# Patient Record
Sex: MALE | Race: WHITE | Employment: OTHER | URBAN - METROPOLITAN AREA
[De-identification: names, ages, dates, MRNs, and addresses within clinical notes are randomized per-mention and may not be internally consistent; named-entity substitution may affect disease eponyms.]

---

## 2020-05-20 NOTE — H&P
[facility]    PATIENT'S NAME: Shantanu Muñoz   ATTENDING PHYSICIAN: Shaun Worthy.  Montserrat Lucas MD   PATIENT ACCOUNT#:   [id number]    LOCATION:    MEDICAL RECORD #:   [id number]       YOB: 1945  ADMISSION DATE:       06/02/2020    HISTORY ALLERGIES:  The patient has no known drug allergies. The patient is allergic to latex. SOCIAL HISTORY:  The patient does not smoke, drinks alcohol rarely. PHYSICAL EXAMINATION:    GENERAL:  A 6 feet 4 inch tall, 220-pound male with a BMI of 27. New or recurrent cancers were reviewed. Possibility that no residual cancer would be found in the excised specimen was discussed.   Possibility that frozen section control of margins could be read as negative, subsequent permanent margins being read as pos

## 2020-05-30 RX ORDER — ASPIRIN 81 MG/1
81 TABLET ORAL DAILY
COMMUNITY

## 2020-05-30 RX ORDER — LOSARTAN POTASSIUM 50 MG/1
50 TABLET ORAL DAILY
COMMUNITY

## 2020-05-30 RX ORDER — ATORVASTATIN CALCIUM 80 MG/1
80 TABLET, FILM COATED ORAL DAILY
COMMUNITY

## 2020-05-30 RX ORDER — PRASUGREL 10 MG/1
10 TABLET, FILM COATED ORAL DAILY
COMMUNITY

## 2020-05-30 RX ORDER — LEVOTHYROXINE SODIUM 0.07 MG/1
75 TABLET ORAL
COMMUNITY

## 2020-06-01 ENCOUNTER — LAB ENCOUNTER (OUTPATIENT)
Dept: LAB | Facility: HOSPITAL | Age: 75
End: 2020-06-01
Attending: PLASTIC SURGERY
Payer: MEDICARE

## 2020-06-01 DIAGNOSIS — Z01.818 PREOP TESTING: ICD-10-CM

## 2020-06-02 ENCOUNTER — ANESTHESIA (OUTPATIENT)
Dept: SURGERY | Facility: HOSPITAL | Age: 75
End: 2020-06-02
Payer: MEDICARE

## 2020-06-02 ENCOUNTER — ANESTHESIA EVENT (OUTPATIENT)
Dept: SURGERY | Facility: HOSPITAL | Age: 75
End: 2020-06-02
Payer: MEDICARE

## 2020-06-02 ENCOUNTER — HOSPITAL ENCOUNTER (OUTPATIENT)
Facility: HOSPITAL | Age: 75
Setting detail: HOSPITAL OUTPATIENT SURGERY
Discharge: HOME | End: 2020-06-02
Attending: PLASTIC SURGERY | Admitting: PLASTIC SURGERY
Payer: MEDICARE

## 2020-06-02 VITALS
HEIGHT: 76 IN | OXYGEN SATURATION: 96 % | RESPIRATION RATE: 14 BRPM | DIASTOLIC BLOOD PRESSURE: 68 MMHG | WEIGHT: 220 LBS | BODY MASS INDEX: 26.79 KG/M2 | TEMPERATURE: 97 F | HEART RATE: 60 BPM | SYSTOLIC BLOOD PRESSURE: 137 MMHG

## 2020-06-02 DIAGNOSIS — C44.329 SQUAMOUS CELL CARCINOMA OF SKIN OF RIGHT CHEEK: ICD-10-CM

## 2020-06-02 DIAGNOSIS — Z01.818 PREOP TESTING: Primary | ICD-10-CM

## 2020-06-02 PROCEDURE — 88331 PATH CONSLTJ SURG 1 BLK 1SPC: CPT | Performed by: PLASTIC SURGERY

## 2020-06-02 PROCEDURE — 0HX1XZZ TRANSFER FACE SKIN, EXTERNAL APPROACH: ICD-10-PCS | Performed by: PLASTIC SURGERY

## 2020-06-02 PROCEDURE — 88305 TISSUE EXAM BY PATHOLOGIST: CPT | Performed by: PLASTIC SURGERY

## 2020-06-02 PROCEDURE — 0HB1XZZ EXCISION OF FACE SKIN, EXTERNAL APPROACH: ICD-10-PCS | Performed by: PLASTIC SURGERY

## 2020-06-02 RX ORDER — NALOXONE HYDROCHLORIDE 0.4 MG/ML
80 INJECTION, SOLUTION INTRAMUSCULAR; INTRAVENOUS; SUBCUTANEOUS AS NEEDED
Status: DISCONTINUED | OUTPATIENT
Start: 2020-06-02 | End: 2020-06-02

## 2020-06-02 RX ORDER — MORPHINE SULFATE 4 MG/ML
4 INJECTION, SOLUTION INTRAMUSCULAR; INTRAVENOUS EVERY 10 MIN PRN
Status: DISCONTINUED | OUTPATIENT
Start: 2020-06-02 | End: 2020-06-02

## 2020-06-02 RX ORDER — HYDROMORPHONE HYDROCHLORIDE 1 MG/ML
0.2 INJECTION, SOLUTION INTRAMUSCULAR; INTRAVENOUS; SUBCUTANEOUS EVERY 5 MIN PRN
Status: DISCONTINUED | OUTPATIENT
Start: 2020-06-02 | End: 2020-06-02

## 2020-06-02 RX ORDER — HALOPERIDOL 5 MG/ML
0.25 INJECTION INTRAMUSCULAR ONCE AS NEEDED
Status: DISCONTINUED | OUTPATIENT
Start: 2020-06-02 | End: 2020-06-02

## 2020-06-02 RX ORDER — HYDROMORPHONE HYDROCHLORIDE 1 MG/ML
0.6 INJECTION, SOLUTION INTRAMUSCULAR; INTRAVENOUS; SUBCUTANEOUS EVERY 5 MIN PRN
Status: DISCONTINUED | OUTPATIENT
Start: 2020-06-02 | End: 2020-06-02

## 2020-06-02 RX ORDER — ONDANSETRON 2 MG/ML
4 INJECTION INTRAMUSCULAR; INTRAVENOUS ONCE AS NEEDED
Status: DISCONTINUED | OUTPATIENT
Start: 2020-06-02 | End: 2020-06-02

## 2020-06-02 RX ORDER — SODIUM CHLORIDE, SODIUM LACTATE, POTASSIUM CHLORIDE, CALCIUM CHLORIDE 600; 310; 30; 20 MG/100ML; MG/100ML; MG/100ML; MG/100ML
INJECTION, SOLUTION INTRAVENOUS CONTINUOUS
Status: DISCONTINUED | OUTPATIENT
Start: 2020-06-02 | End: 2020-06-02

## 2020-06-02 RX ORDER — FAMOTIDINE 20 MG/1
20 TABLET ORAL ONCE
Status: DISCONTINUED | OUTPATIENT
Start: 2020-06-02 | End: 2020-06-02 | Stop reason: HOSPADM

## 2020-06-02 RX ORDER — DIAPER,BRIEF,INFANT-TODD,DISP
EACH MISCELLANEOUS AS NEEDED
Status: DISCONTINUED | OUTPATIENT
Start: 2020-06-02 | End: 2020-06-02 | Stop reason: HOSPADM

## 2020-06-02 RX ORDER — MORPHINE SULFATE 10 MG/ML
6 INJECTION, SOLUTION INTRAMUSCULAR; INTRAVENOUS EVERY 10 MIN PRN
Status: DISCONTINUED | OUTPATIENT
Start: 2020-06-02 | End: 2020-06-02

## 2020-06-02 RX ORDER — PROCHLORPERAZINE EDISYLATE 5 MG/ML
5 INJECTION INTRAMUSCULAR; INTRAVENOUS ONCE AS NEEDED
Status: DISCONTINUED | OUTPATIENT
Start: 2020-06-02 | End: 2020-06-02

## 2020-06-02 RX ORDER — ACETAMINOPHEN 500 MG
1000 TABLET ORAL ONCE
Status: COMPLETED | OUTPATIENT
Start: 2020-06-02 | End: 2020-06-02

## 2020-06-02 RX ORDER — MIDAZOLAM HYDROCHLORIDE 1 MG/ML
INJECTION INTRAMUSCULAR; INTRAVENOUS AS NEEDED
Status: DISCONTINUED | OUTPATIENT
Start: 2020-06-02 | End: 2020-06-02 | Stop reason: SURG

## 2020-06-02 RX ORDER — METOCLOPRAMIDE 10 MG/1
10 TABLET ORAL ONCE
Status: DISCONTINUED | OUTPATIENT
Start: 2020-06-02 | End: 2020-06-02 | Stop reason: HOSPADM

## 2020-06-02 RX ORDER — ACETAMINOPHEN 325 MG/1
650 TABLET ORAL EVERY 6 HOURS PRN
Status: DISCONTINUED | OUTPATIENT
Start: 2020-06-02 | End: 2020-06-02

## 2020-06-02 RX ORDER — HYDROMORPHONE HYDROCHLORIDE 1 MG/ML
0.4 INJECTION, SOLUTION INTRAMUSCULAR; INTRAVENOUS; SUBCUTANEOUS EVERY 5 MIN PRN
Status: DISCONTINUED | OUTPATIENT
Start: 2020-06-02 | End: 2020-06-02

## 2020-06-02 RX ORDER — ACETAMINOPHEN AND CODEINE PHOSPHATE 300; 30 MG/1; MG/1
1 TABLET ORAL EVERY 6 HOURS PRN
Status: DISCONTINUED | OUTPATIENT
Start: 2020-06-02 | End: 2020-06-02

## 2020-06-02 RX ORDER — CEFAZOLIN SODIUM/WATER 2 G/20 ML
2 SYRINGE (ML) INTRAVENOUS ONCE
Status: COMPLETED | OUTPATIENT
Start: 2020-06-02 | End: 2020-06-02

## 2020-06-02 RX ORDER — MORPHINE SULFATE 4 MG/ML
2 INJECTION, SOLUTION INTRAMUSCULAR; INTRAVENOUS EVERY 10 MIN PRN
Status: DISCONTINUED | OUTPATIENT
Start: 2020-06-02 | End: 2020-06-02

## 2020-06-02 RX ORDER — LIDOCAINE HYDROCHLORIDE AND EPINEPHRINE 5; 5 MG/ML; UG/ML
INJECTION, SOLUTION INFILTRATION; PERINEURAL AS NEEDED
Status: DISCONTINUED | OUTPATIENT
Start: 2020-06-02 | End: 2020-06-02 | Stop reason: HOSPADM

## 2020-06-02 RX ORDER — HYDROCODONE BITARTRATE AND ACETAMINOPHEN 5; 325 MG/1; MG/1
1 TABLET ORAL AS NEEDED
Status: DISCONTINUED | OUTPATIENT
Start: 2020-06-02 | End: 2020-06-02

## 2020-06-02 RX ORDER — HYDROCODONE BITARTRATE AND ACETAMINOPHEN 5; 325 MG/1; MG/1
2 TABLET ORAL AS NEEDED
Status: DISCONTINUED | OUTPATIENT
Start: 2020-06-02 | End: 2020-06-02

## 2020-06-02 RX ORDER — ACETAMINOPHEN AND CODEINE PHOSPHATE 300; 30 MG/1; MG/1
2 TABLET ORAL EVERY 6 HOURS PRN
Status: DISCONTINUED | OUTPATIENT
Start: 2020-06-02 | End: 2020-06-02

## 2020-06-02 RX ADMIN — CEFAZOLIN SODIUM/WATER 2 G: 2 G/20 ML SYRINGE (ML) INTRAVENOUS at 07:25:00

## 2020-06-02 RX ADMIN — MIDAZOLAM HYDROCHLORIDE 1 MG: 1 INJECTION INTRAMUSCULAR; INTRAVENOUS at 07:19:00

## 2020-06-02 RX ADMIN — SODIUM CHLORIDE, SODIUM LACTATE, POTASSIUM CHLORIDE, CALCIUM CHLORIDE: 600; 310; 30; 20 INJECTION, SOLUTION INTRAVENOUS at 06:54:00

## 2020-06-02 RX ADMIN — MIDAZOLAM HYDROCHLORIDE 1 MG: 1 INJECTION INTRAMUSCULAR; INTRAVENOUS at 07:15:00

## 2020-06-02 NOTE — DISCHARGE SUMMARY
[facility] HOSP - [facility]    Discharge Summary    Charito Nelson Patient Status:  Hospital Outpatient Surgery    1945 MRN [id number]   Location [address] Attending [address] Villalta, Sam Dixon MD   Hosp Day # 0 PCP No carmela Your judgment and reflexes may be altered, even if you feel like your normal self.       We Recommend:   · Do not drive any motor vehicle or bicycle   · Avoid mowing the lawn, playing sports, or working with power tools/applicances (power saws, electric kni office. 2. Cleansing:  After dressing is changed at office, wound cleansing can be begin. You may shower, but do not let water run directly on the wound. You may gently wash around the wound with mild soap and water.   Dab wound dry, do not rub across

## 2020-06-02 NOTE — BRIEF OP NOTE
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

## 2020-06-02 NOTE — OPERATIVE REPORT
[facility]    PATIENT'S NAME: Kuldip Lozano   ATTENDING PHYSICIAN: Gatito Shah. Tisha Ingram MD   OPERATING PHYSICIAN: Gatito Shah.  Tisha Ingram MD   PATIENT ACCOUNT#:   [id number]    LOCATION:  [address]  MEDICAL RECORD #:   [id number] the 11 o'clock to 12 o'clock to 1 o'clock position, taking a dart along that direction which would roughly parallel the direction of the nasolabial fold. This would be extended toward the nasomaxillary area.   This was now excised, marked with suture at th from the lower eyelid area, avoiding any significant inferior traction on the lower eyelid. All skin was cleansed with saline. There was 2-second capillary refill in the central portion of flap at completion of procedure.   The wound was covered with baci

## 2020-06-02 NOTE — ANESTHESIA PREPROCEDURE EVALUATION
Anesthesia PreOp Note    HPI:     Meagan Sepulveda is a 69year old male who presents for preoperative consultation requested by: Marquita Caba., Wilfrid Fonseca MD    Date of Surgery: 6/2/2020    Procedure(s):  EXCISION LESION HEAD/NECK/FACE  Indication: squamous cell RASH    Family History   Problem Relation Age of Onset   • Cancer Father         Colon cancer   • No Known Problems Mother      Social History    Socioeconomic History      Marital status:       Spouse name: Not on file      Number of children: Not Temp:  97.8 °F (36.6 °C)   TempSrc:  Oral   SpO2:  96%   Weight: 99.8 kg (220 lb) 99.8 kg (220 lb)   Height: 1.93 m (6' 4\") 1.93 m (6' 4\")        Anesthesia Evaluation     Patient summary reviewed and Nursing notes reviewed    Airway   Mallampati: II

## 2020-06-02 NOTE — INTERVAL H&P NOTE
Pre-op Diagnosis: squamous cell cancer right mid cheek    The above referenced H&P was reviewed by Neal Aaron MD on 6/2/2020, the patient was examined and no significant changes have occurred in the patient's condition since the H&P was performed

## 2023-05-17 ENCOUNTER — OFFICE VISIT (OUTPATIENT)
Facility: CLINIC | Age: 78
End: 2023-05-17

## 2023-05-17 ENCOUNTER — TELEPHONE (OUTPATIENT)
Facility: CLINIC | Age: 78
End: 2023-05-17

## 2023-05-17 VITALS
BODY MASS INDEX: 27.05 KG/M2 | HEART RATE: 57 BPM | SYSTOLIC BLOOD PRESSURE: 109 MMHG | DIASTOLIC BLOOD PRESSURE: 57 MMHG | HEIGHT: 76 IN | WEIGHT: 222.13 LBS

## 2023-05-17 DIAGNOSIS — Z80.0 FAMILY HISTORY OF COLON CANCER: Primary | ICD-10-CM

## 2023-05-17 DIAGNOSIS — Z86.010 HISTORY OF COLON POLYPS: ICD-10-CM

## 2023-05-17 PROCEDURE — 3074F SYST BP LT 130 MM HG: CPT | Performed by: INTERNAL MEDICINE

## 2023-05-17 PROCEDURE — 1159F MED LIST DOCD IN RCRD: CPT | Performed by: INTERNAL MEDICINE

## 2023-05-17 PROCEDURE — 3078F DIAST BP <80 MM HG: CPT | Performed by: INTERNAL MEDICINE

## 2023-05-17 PROCEDURE — 99203 OFFICE O/P NEW LOW 30 MIN: CPT | Performed by: INTERNAL MEDICINE

## 2023-05-17 PROCEDURE — 3008F BODY MASS INDEX DOCD: CPT | Performed by: INTERNAL MEDICINE

## 2023-05-17 PROCEDURE — 1126F AMNT PAIN NOTED NONE PRSNT: CPT | Performed by: INTERNAL MEDICINE

## 2023-05-17 RX ORDER — TRIAMCINOLONE ACETONIDE 5 MG/G
1 CREAM TOPICAL 2 TIMES DAILY
COMMUNITY
Start: 2023-05-08

## 2023-05-17 RX ORDER — KETOCONAZOLE 20 MG/ML
1 SHAMPOO TOPICAL DAILY
COMMUNITY
Start: 2023-03-02

## 2023-05-17 RX ORDER — AMLODIPINE BESYLATE 5 MG/1
5 TABLET ORAL DAILY
COMMUNITY
Start: 2023-02-13

## 2023-05-17 RX ORDER — SODIUM, POTASSIUM,MAG SULFATES 17.5-3.13G
SOLUTION, RECONSTITUTED, ORAL ORAL
Qty: 1 EACH | Refills: 0 | Status: SHIPPED | OUTPATIENT
Start: 2023-05-17

## 2023-05-17 RX ORDER — METOPROLOL SUCCINATE 100 MG/1
100 TABLET, EXTENDED RELEASE ORAL DAILY
COMMUNITY
Start: 2022-06-03

## 2023-05-17 RX ORDER — CHLORTHALIDONE 25 MG/1
25 TABLET ORAL DAILY
COMMUNITY
Start: 2023-04-10

## 2023-05-17 NOTE — TELEPHONE ENCOUNTER
Scheduled for:  Colonoscopy [address] , [address] [facility]   Provider Name:    Date:  7/11/2023  Location:  [facility]   Sedation:  Mac   Time: 1:00 Pm (pt is aware to arrive at 12:00 Pm )   Prep: Split dose Suprep  Prep instructions were given to pt in the office, I discuss prep Instructions with patient at the time of the appointment which he verbally understood and given the prep instructions at the time of the appointment  Meds/Allergies Reconciled?:  Physician reviewed     Diagnosis with codes:  Family history of Colon cancer Z80.0 ,H/o Colon Polyps Z86.010  Was patient informed to call insurance with codes (Y/N):  Yes, I confirmed  [employer]  medicare advantage insurance with the patient. The patient also verbally understands to call his  insurance to check for pre-cert, codes were given on prep instructions. Referral sent?:  Referral was sent at the time of electronic surgical scheduling. [facility] or [facility] Th  notified?:  I sent an electronic request to Endo Scheduling and received a confirmation today. Medication Orders: This patient verbally confirmed that he is  taking:   Iron, blood thinners to hold his Prasugrel for 7 days prior to procedure and will notify Dr. Ryan Jerry's office at [address] [place] [address] - if its okay to hold. , BP meds, and is not diabetic   Has latex allergy, Not PCN allergy and does not have a pacemaker Pt is aware to NOT take iron pills, herbal meds and diet supplements for 7 days before exam. Also to NOT take any form of alcohol, recreational drugs and any forms of ED meds 24 hours before exam.    Misc Orders:  Patient verbally understood & will await a phone call from [facility] to schedule.       Further instructions given by staff:

## 2023-05-17 NOTE — PATIENT INSTRUCTIONS
1.  Schedule colonoscopy for a family history of colon cancer and a personal history of colon polyps following a split dose Suprep and either IV sedation or monitored anesthesia care per scheduling. 2.  Hold prasugrel for 7 days preceding your procedure (if okay with your cardiologist). Aspirin should be continued.

## 2023-05-17 NOTE — TELEPHONE ENCOUNTER
Fyi;Rn's  Patient will be having Procedure on 7/11/2023 at [facility] for Colonoscopy   Please Call PCP or Endocrine for the following recommendation for Medications adjustment of Diabetic meds or insulin   Or Anti-coagulants or anti-platelet ( Prasugrel )    Dr.Amir Jerry at [facility] (Cardiology) at [phone]  Prior to Procedure  Hold prasugrel for 7 days preceding your procedure (if okay with your cardiologist). Aspirin should be continued.

## 2023-07-11 ENCOUNTER — ANESTHESIA (OUTPATIENT)
Dept: ENDOSCOPY | Facility: HOSPITAL | Age: 78
End: 2023-07-11
Payer: MEDICARE

## 2023-07-11 ENCOUNTER — HOSPITAL ENCOUNTER (OUTPATIENT)
Facility: HOSPITAL | Age: 78
Setting detail: HOSPITAL OUTPATIENT SURGERY
Discharge: HOME | End: 2023-07-11
Attending: INTERNAL MEDICINE | Admitting: INTERNAL MEDICINE
Payer: MEDICARE

## 2023-07-11 ENCOUNTER — ANESTHESIA EVENT (OUTPATIENT)
Dept: ENDOSCOPY | Facility: HOSPITAL | Age: 78
End: 2023-07-11
Payer: MEDICARE

## 2023-07-11 VITALS
BODY MASS INDEX: 26.79 KG/M2 | OXYGEN SATURATION: 96 % | HEIGHT: 76 IN | HEART RATE: 56 BPM | SYSTOLIC BLOOD PRESSURE: 136 MMHG | DIASTOLIC BLOOD PRESSURE: 70 MMHG | WEIGHT: 220 LBS | RESPIRATION RATE: 15 BRPM

## 2023-07-11 DIAGNOSIS — Z86.010 HISTORY OF COLON POLYPS: ICD-10-CM

## 2023-07-11 DIAGNOSIS — Z80.0 FAMILY HISTORY OF COLON CANCER: ICD-10-CM

## 2023-07-11 PROCEDURE — 45385 COLONOSCOPY W/LESION REMOVAL: CPT | Performed by: INTERNAL MEDICINE

## 2023-07-11 PROCEDURE — 0DBH8ZX EXCISION OF CECUM, VIA NATURAL OR ARTIFICIAL OPENING ENDOSCOPIC, DIAGNOSTIC: ICD-10-PCS | Performed by: INTERNAL MEDICINE

## 2023-07-11 PROCEDURE — 0DBM8ZX EXCISION OF DESCENDING COLON, VIA NATURAL OR ARTIFICIAL OPENING ENDOSCOPIC, DIAGNOSTIC: ICD-10-PCS | Performed by: INTERNAL MEDICINE

## 2023-07-11 PROCEDURE — 0DBL8ZX EXCISION OF TRANSVERSE COLON, VIA NATURAL OR ARTIFICIAL OPENING ENDOSCOPIC, DIAGNOSTIC: ICD-10-PCS | Performed by: INTERNAL MEDICINE

## 2023-07-11 PROCEDURE — 0DBK8ZX EXCISION OF ASCENDING COLON, VIA NATURAL OR ARTIFICIAL OPENING ENDOSCOPIC, DIAGNOSTIC: ICD-10-PCS | Performed by: INTERNAL MEDICINE

## 2023-07-11 RX ORDER — EPHEDRINE SULFATE 50 MG/ML
INJECTION INTRAVENOUS AS NEEDED
Status: DISCONTINUED | OUTPATIENT
Start: 2023-07-11 | End: 2023-07-11 | Stop reason: SURG

## 2023-07-11 RX ORDER — LIDOCAINE HYDROCHLORIDE 10 MG/ML
INJECTION, SOLUTION EPIDURAL; INFILTRATION; INTRACAUDAL; PERINEURAL AS NEEDED
Status: DISCONTINUED | OUTPATIENT
Start: 2023-07-11 | End: 2023-07-11 | Stop reason: SURG

## 2023-07-11 RX ORDER — SODIUM CHLORIDE, SODIUM LACTATE, POTASSIUM CHLORIDE, CALCIUM CHLORIDE 600; 310; 30; 20 MG/100ML; MG/100ML; MG/100ML; MG/100ML
INJECTION, SOLUTION INTRAVENOUS CONTINUOUS
Status: DISCONTINUED | OUTPATIENT
Start: 2023-07-11 | End: 2023-07-11

## 2023-07-11 RX ADMIN — SODIUM CHLORIDE, SODIUM LACTATE, POTASSIUM CHLORIDE, CALCIUM CHLORIDE: 600; 310; 30; 20 INJECTION, SOLUTION INTRAVENOUS at 13:51:00

## 2023-07-11 RX ADMIN — SODIUM CHLORIDE, SODIUM LACTATE, POTASSIUM CHLORIDE, CALCIUM CHLORIDE: 600; 310; 30; 20 INJECTION, SOLUTION INTRAVENOUS at 13:21:00

## 2023-07-11 RX ADMIN — EPHEDRINE SULFATE 10 MG: 50 INJECTION INTRAVENOUS at 13:48:00

## 2023-07-11 RX ADMIN — LIDOCAINE HYDROCHLORIDE 50 MG: 10 INJECTION, SOLUTION EPIDURAL; INFILTRATION; INTRACAUDAL; PERINEURAL at 13:21:00

## 2023-07-11 NOTE — ANESTHESIA POSTPROCEDURE EVALUATION
Patient: Soraida Vargas.     Procedure Summary       Date: 07/11/23 Room / Location: [address] [place] [address] ENDOSCOPY 04 / [address] [place] [address] ENDOSCOPY    Anesthesia Start: 5271 Anesthesia Stop:     Procedure: COLONOSCOPY Diagnosis:       Family history of colon cancer      History of colon polyps      (diverticulosis, polyps)    Surgeons: Beryle Byers, MD Anesthesiologist: Isabel Martinez CRNA    Anesthesia Type: MAC ASA Status: 3            Anesthesia Type: MAC    Vitals Value Taken Time   /66 07/11/23 1414   Temp 98.6 07/11/23 1414   Pulse 60 07/11/23 1414   Resp 17 07/11/23 1414   SpO2 95 07/11/23 1414       [facility] AN Post Evaluation:   Patient Evaluated in PACU  Patient Participation: complete - patient participated  Level of Consciousness: awake  Pain Score: 0  Pain Management: adequate  Airway Patency:patent  Dental exam unchanged from preop  Yes    Cardiovascular Status: acceptable  Respiratory Status: acceptable  Postoperative Hydration acceptable      [address], CRNA  7/11/2023 2:14 PM

## 2023-07-11 NOTE — H&P
History & Physical Examination    Patient Name: Linda David MRN: [id number]  CSN: [id number]  YOB: 1945    Diagnosis: Colorectal cancer screening, family history of colon cancer, personal history of adenomatous colon polyps      amLODIPine 5 MG Oral Tab, Take 1 tablet (5 mg total) by mouth daily. , Disp: , Rfl: , 7/10/2023  chlorthalidone 25 MG Oral Tab, Take 1 tablet (25 mg total) by mouth daily. , Disp: , Rfl: , 7/10/2023  metoprolol succinate  MG Oral Tablet 24 Hr, Take 1 tablet (100 mg total) by mouth daily. , Disp: , Rfl: , 7/10/2023  aspirin 81 MG Oral Tab EC, Take 1 tablet (81 mg total) by mouth daily. , Disp: , Rfl: , 7/9/2023  atorvastatin 80 MG Oral Tab, Take 1 tablet (80 mg total) by mouth daily. , Disp: , Rfl: , 7/10/2023 at 0800  Levothyroxine Sodium 75 MCG Oral Tab, Take 1 tablet (75 mcg total) by mouth before breakfast., Disp: , Rfl: , 7/10/2023  losartan Potassium 50 MG Oral Tab, Take 1 tablet (50 mg total) by mouth daily. , Disp: , Rfl: , 7/10/2023  Prasugrel HCl 10 MG Oral Tab, Take 1 tablet (10 mg total) by mouth daily. , Disp: , Rfl: , 7/4/2023  ketoconazole 2 % External Shampoo, Apply 1 Application topically daily. , Disp: , Rfl:   triamcinolone 0.5 % External Cream, Apply 1 Application topically in the morning and 1 Application before bedtime. , Disp: , Rfl:   Na Sulfate-K Sulfate-Mg Sulf (SUPREP BOWEL PREP KIT) 17.5-3.13-1.6 GM/177ML Oral Solution, Take as directed, Disp: 1 each, Rfl: 0      lactated ringers infusion, , Intravenous, Continuous        Allergies:   Dust Mite Extract       OTHER (SEE COMMENTS)  Adhesive Tape           RASH  Latex                   RASH    Past Medical History:   Diagnosis Date    Back problem     Colon polyp     polyps removed last visit    Constipation a year    Disorder of thyroid     Essential hypertension     being treated    Gastrointestinal bleeding one episode 5 yrs ago    Hearing impairment     Heart attack ([address]     Heart disease MI 2014, 3 stents,    High blood pressure     High cholesterol     Hyperlipidemia     being treated    Pulmonary embolism [facility])      Past Surgical History:   Procedure Laterality Date    ANGIOPLASTY (CORONARY)  2014, 3 stents    CATH BARE METAL STENT (BMS)  2015    2 coronary stents    CATH PERCUTANEOUS  TRANSLUMINAL CORONARY ANGIOPLASTY      COLONOSCOPY  last one, 4 years ago    HERNIA SURGERY       Family History   Problem Relation Age of Onset    Cancer Father         Colon cancer    No Known Problems Mother      Social History    Tobacco Use      Smoking status: Never      Smokeless tobacco: Never    Alcohol use: Yes      Alcohol/week: 1.0 standard drink of alcohol      Types: 1 Glasses of wine per week      Comment: rare      SYSTEM Check if Review is Normal Check if Physical Exam is Normal If not normal, please explain:   HEENT [employer] ] [ Caryl Ortiz  [employer] ] [ X]    HEART [employer] ] [ Glemakenna Drought [employer] ] [ Davy Pottier [employer] ] [ Jassi Malloy [employer] ] [ Magda Finely        [ x ] I have discussed the risks and benefits and alternatives with the patient/family. They understand and agree to proceed with plan of care. [ x ] I have reviewed the History and Physical done within the last 30 days. Any changes noted above.     Nenita Carranza MD  7/11/2023  1:09 PM

## 2023-07-11 NOTE — OPERATIVE REPORT
[address] Endoscopy Report      Date of Procedure:  07/11/23      Preoperative Diagnosis:  1. Colorectal cancer screening  2. Family history of colon cancer  3. Personal history of adenomatous colon polyps      Postoperative Diagnosis:  1. Colon polyps  2. Sigmoid colon diverticulosis  3. Fair colonoscopic preparation      Procedure:    Colonoscopy with polypectomy      Surgeon:  Nelly Lindquist M.D. Anesthesia:  Monitored anesthesia care  Cecal withdrawal time: 31 minutes  EBL:  Insignificant      Brief History: This is a 78year old male who presents for a screening/surveillance colonoscopy in the setting of a family history of colon cancer in his father at the age of 79. He had adenomatous polyps removed endoscopically in 2013. His last examination was in 2016 and negative for metachronous polyps. A 5-year surveillance examination was advised but has not been performed. Other than a tendency towards constipation which improves with Metamucil the patient has been asymptomatic from a lower gastrointestinal tract standpoint. Technique:  After informed consent, the patient was placed in the left lateral recumbent position. Digital rectal examination revealed no palpable intraluminal abnormalities. An Olympus variable stiffness 190 series HD colonoscope was inserted into the rectum and advanced under direct vision by following the lumen to the ascending colon. The patient was made supine and the instrument advanced to the terminal ileum. The colon was examined upon withdrawal in the supine position. Findings:  The preparation of the colon was only fair. There were clumps of vegetable matter scattered throughout the colon along with multiple oil droplets. The terminal ileum was examined for a few cm and visually normal.  The ileocecal valve was well preserved.  The visualized colonic mucosa from the cecum to the anal verge was normal with an intact vascular pattern. There were #7 polyps seen within the colon which removed as follows:    1. In the cecum there was a 3 mm sessile polyp which was cold snare excised and retrieved. 2.  In the ascending colon there were #2 polyps measuring 3 and 11 mm in size. Each was cold snare excised and retrieved. The larger polypectomy site was closed with #3 hemostatic clips. 3.  In the transverse colon there were #2 3-5 mm sessile polyps which were cold snare excised and retrieved. 4.  In the descending colon there were #2 3-5 mm sessile polyps which were cold snare excised and retrieved. Inspection of all sites revealed no evidence of ongoing bleeding. There were multiple diverticula seen in the sigmoid colon without current signs of complication. There were no other colonic polyps, mass lesions, vascular anomalies or signs of inflammation seen. Retroflexion in the rectum revealed no abnormalities. The procedure was well tolerated without immediate complication. Impression:  1. Colon polyps  2. Uncomplicated sigmoid colon diverticulosis  3. Fair colonoscopic preparation which includes oil droplets. Query regarding recent ingestion. If no recent ingestion of oily/greasy substances evaluate for malabsorption. Recommendations:  1. Standard post polypectomy instructions given. 2.  Resume prasugrel tomorrow. 3.  Follow-up biopsy results. 4.  Short interval colonoscopy in light of the multiple polyps and fair colonoscopic preparation.         Greta Mills MD  7/11/2023

## 2023-07-11 NOTE — DISCHARGE INSTRUCTIONS

## 2023-07-14 ENCOUNTER — TELEPHONE (OUTPATIENT)
Facility: CLINIC | Age: 78
End: 2023-07-14

## 2023-07-14 NOTE — TELEPHONE ENCOUNTER
----- Message from Kem Harrison MD sent at 7/13/2023  6:55 PM CDT -----  I spoke to the patient's wife who accompanied the patient to his endoscopy. The patient is outside. He had #7 adenomatous polyps removed. I discussed the significance. Uncomplicated diverticulosis was present. The preparation was fair. I have recommended a high-fiber diet for diverticulosis and a surveillance colonoscopy in about 1 year based on the multiple polyps and fair colonoscopic preparation. GI RNs: Please enter colonoscopy recall for 1 year.

## 2023-07-14 NOTE — TELEPHONE ENCOUNTER
Health maintenance updated. Last colonoscopy done 7/11/23. 1 year recall placed into Pt Outreach, next due on 07/2024 per Dr. Carlie Metcalf.

## 2023-11-20 ENCOUNTER — LAB REQUISITION (OUTPATIENT)
Dept: LAB | Facility: HOSPITAL | Age: 78
End: 2023-11-20
Payer: MEDICARE

## 2023-11-20 DIAGNOSIS — L29.8 OTHER PRURITUS: ICD-10-CM

## 2023-11-20 DIAGNOSIS — D48.5 NEOPLASM OF UNCERTAIN BEHAVIOR OF SKIN: ICD-10-CM

## 2023-11-20 DIAGNOSIS — L82.0 INFLAMED SEBORRHEIC KERATOSIS: ICD-10-CM

## 2023-11-20 PROCEDURE — 88305 TISSUE EXAM BY PATHOLOGIST: CPT | Performed by: PLASTIC SURGERY

## 2024-01-31 ENCOUNTER — TELEPHONE (OUTPATIENT)
Facility: CLINIC | Age: 79
End: 2024-01-31

## 2024-01-31 NOTE — TELEPHONE ENCOUNTER
Dr Kapoor    Called the patient, I spoke to his spouse Barbara, patient /name verified.    She reported the patient has been c/o throat pain since October last year but worse yesterday.    She relates this was discussed with Dr La and ENT from [facility] and both recommended GI consult.    As per his spouse, he does not have swallowing issues or sob, n/v. She cannot describe more about his pain.  Advised if pain is severe, the patient should go to ED for evaluation.   Barbara verbalized understanding.    She is asking if you can see him sooner.    Is this Friday 3 pm ok or on Monday? This is approved by Minnie.    Thank you

## 2024-02-01 NOTE — TELEPHONE ENCOUNTER
Called and spoke to the patient, /name verified    Your Appointments      2024  3:00 PM  Follow Up Visit with Antwon Goodrich MD  [facility] [address]   [facility] [address]  [phone]     Advised to come 10-15 mins early.    2024 appt canceled per request.

## 2024-02-02 ENCOUNTER — TELEPHONE (OUTPATIENT)
Facility: CLINIC | Age: 79
End: 2024-02-02

## 2024-02-02 ENCOUNTER — OFFICE VISIT (OUTPATIENT)
Facility: CLINIC | Age: 79
End: 2024-02-02
Payer: MEDICARE

## 2024-02-02 VITALS
DIASTOLIC BLOOD PRESSURE: 68 MMHG | TEMPERATURE: 97 F | HEIGHT: 76 IN | SYSTOLIC BLOOD PRESSURE: 136 MMHG | WEIGHT: 217.13 LBS | BODY MASS INDEX: 26.44 KG/M2 | HEART RATE: 54 BPM

## 2024-02-02 DIAGNOSIS — Z83.719 FAMILY HX COLONIC POLYPS: ICD-10-CM

## 2024-02-02 DIAGNOSIS — D50.9 IRON DEFICIENCY ANEMIA, UNSPECIFIED IRON DEFICIENCY ANEMIA TYPE: ICD-10-CM

## 2024-02-02 DIAGNOSIS — Z86.010 HISTORY OF COLON POLYPS: ICD-10-CM

## 2024-02-02 DIAGNOSIS — J02.9 SORE THROAT: ICD-10-CM

## 2024-02-02 DIAGNOSIS — K21.9 GASTROESOPHAGEAL REFLUX DISEASE WITHOUT ESOPHAGITIS: Primary | ICD-10-CM

## 2024-02-02 DIAGNOSIS — D50.9 IRON DEFICIENCY ANEMIA, UNSPECIFIED IRON DEFICIENCY ANEMIA TYPE: Primary | ICD-10-CM

## 2024-02-02 PROCEDURE — 99214 OFFICE O/P EST MOD 30 MIN: CPT | Performed by: INTERNAL MEDICINE

## 2024-02-02 RX ORDER — CLOPIDOGREL BISULFATE 75 MG/1
75 TABLET ORAL DAILY
COMMUNITY
Start: 2023-10-20

## 2024-02-02 RX ORDER — FERROUS SULFATE 325(65) MG
325 TABLET ORAL
COMMUNITY
Start: 2023-08-31

## 2024-02-02 RX ORDER — POLYETHYLENE GLYCOL 3350, SODIUM CHLORIDE, SODIUM BICARBONATE, POTASSIUM CHLORIDE 420; 11.2; 5.72; 1.48 G/4L; G/4L; G/4L; G/4L
4 POWDER, FOR SOLUTION ORAL ONCE
Qty: 4000 ML | Refills: 0 | Status: SHIPPED | OUTPATIENT
Start: 2024-02-02 | End: 2024-02-02

## 2024-02-02 NOTE — PROGRESS NOTES
Subjective:   Patient ID: Albert Marks Jr. is a 78 year old male.    MARGARITA Price returns in follow-up today with his wife Barbara at the request of [facility] (ENT).    As per previous notes the patient has a family history of colon cancer in his father at the age of 67 and a personal history of colon polyps.  His last colonoscopy in July 2023 revealed #7 adenomatous polyps including an 11 mm adenoma.  Sigmoid colon diverticulosis was present.  The colonoscopic preparation was only fair.  A 1 year surveillance examination was advised.    In October of this year the patient developed burning discomfort in the mouth/throat around his uvula.  This is especially prominent in the mornings until \"lubricated\".  Symptoms worsen with swallowing and talking.  There was also associated hoarseness.  He saw Dr. Sloan from ENT about 10 days prior.  His symptoms had improved by that visit.  Direct laryngoscopy was negative.  Dr. Sloan recommended endoscopy in light of an iron deficiency anemia (see below) and a family history of esophageal cancer in his sister.    The patient relates that his symptoms today are better.    The patient had routine laboratory testing in December 2022 which revealed a hemoglobin of 12.9.  Harry hemoglobin in June and August 2023 were 12.0.  Serum ferritin in August and October were 10 and 11 respectively.  Iron saturation was 19%.  A vitamin B12 level was normal at 228.  The patient was started on iron replacement.  His most recent hemoglobin on December 11, 2023 had increased to 14.1.    The patient has been on dual antiplatelet therapy for quite some time.  Last coronary intervention was several years prior.  He is currently taking 81 mg of aspirin and 10 mg of prasugrel.    The patient's appetite is good.  His weight is stable.  He denies dysphagia, odynophagia (chest) or frequent heartburn.  He has an occasional cough.  He denies abdominal pain, changes in bowel movements or  bleeding.    The patient has a longstanding history of postnasal drip.    He is a lifelong non-smoker.  No excessive alcohol.      History/Other:   Review of Systems  See above    Wt Readings from Last 7 Encounters:   02/02/24 217 lb 1.6 oz (98.5 kg)   06/30/23 220 lb (99.8 kg)   05/17/23 222 lb 1.6 oz (100.7 kg)   06/02/20 220 lb (99.8 kg)       Current Outpatient Medications   Medication Sig Dispense Refill    Ferrous Sulfate 325 (65 Fe) MG Oral Tab Take 1 tablet (325 mg total) by mouth 3 (three) times a week at 1600.      clopidogrel 75 MG Oral Tab Take 1 tablet (75 mg total) by mouth daily.      PEG 3350-KCl-Na Bicarb-NaCl (TRILYTE) 420 g Oral Recon Soln Take 4,000 mL (4 L total) by mouth one time for 1 dose. 4000 mL 0    amLODIPine 5 MG Oral Tab Take 1 tablet (5 mg total) by mouth daily.      chlorthalidone 25 MG Oral Tab Take 1 tablet (25 mg total) by mouth daily.      ketoconazole 2 % External Shampoo Apply 1 Application topically daily.      metoprolol succinate  MG Oral Tablet 24 Hr Take 1 tablet (100 mg total) by mouth daily.      aspirin 81 MG Oral Tab EC Take 1 tablet (81 mg total) by mouth daily.      atorvastatin 80 MG Oral Tab Take 1 tablet (80 mg total) by mouth daily.      Levothyroxine Sodium 75 MCG Oral Tab Take 1 tablet (75 mcg total) by mouth before breakfast.      losartan Potassium 50 MG Oral Tab Take 1 tablet (50 mg total) by mouth daily.      Prasugrel HCl 10 MG Oral Tab Take 1 tablet (10 mg total) by mouth daily.      triamcinolone 0.5 % External Cream Apply 1 Application topically in the morning and 1 Application before bedtime.       Allergies:  Allergies   Allergen Reactions    Dust Mite Extract OTHER (SEE COMMENTS)    Adhesive Tape RASH    Latex RASH       Objective:   Physical Exam  Vitals and nursing note reviewed.   Constitutional:       General: He is not in acute distress.     Appearance: He is well-developed. He is not ill-appearing, toxic-appearing or diaphoretic.   HENT:       Mouth/Throat:      Pharynx: No oropharyngeal exudate.      Comments: Oropharynx well-visualized and normal.  Eyes:      General: No scleral icterus.     Conjunctiva/sclera: Conjunctivae normal.   Neck:      Thyroid: No thyromegaly.   Cardiovascular:      Rate and Rhythm: Normal rate and regular rhythm.      Heart sounds: Normal heart sounds. No murmur heard.  Pulmonary:      Effort: Pulmonary effort is normal. No respiratory distress.      Breath sounds: Normal breath sounds. No wheezing or rales.   Abdominal:      General: Bowel sounds are normal. There is no distension.      Palpations: Abdomen is soft. There is no mass.      Tenderness: There is no abdominal tenderness. There is no guarding or rebound.   Musculoskeletal:      Cervical back: Neck supple.   Lymphadenopathy:      Cervical: No cervical adenopathy.   Neurological:      Mental Status: He is alert and oriented to person, place, and time.   Psychiatric:         Behavior: Behavior normal.       Related to CBC W/ DIFF  Component 12/11/23 12/11/23 10/30/23 08/01/23 06/21/23 12/16/22   WBC MICROSCOPIC NOT INDICATED 6.4 6.6 5.1 6.0 6.2   RBC MICROSCOPIC NOT INDICATED 4.41 4.07 Low  3.86 Low  3.94 Low  3.95 Low    HGB -- 14.1 12.8 Low  12.0 Low  12.0 Low  12.9 Low    HCT -- 43.2 38.8 36.7 Low  37.2 Low  39.1   MCV -- 98.0 95.3 95.1 94.4 99.0   MCH -- 32.0 31.4 31.1 30.5 32.7   MCHC -- 32.6 33.0 32.7 32.3 33.0   RDW -- 13.8 14.2 13.7 13.6 12.7   PLT CNT -- 220 200 184 210 221   MPV -- 11.4 11.0 11.6 11.7 11.5   DIFF TYPE -- AUTOMATED AUTOMATED AUTOMATED AUTOMATED AUTOMATED   GRAN % -- 68 72 61 63 62   GRAN # -- 4.4 4.7 3.1 3.8 3.8   LYMPH % -- 16 14 20 21 21   LYMPH # -- 1.0 0.9 Low  1.0 1.3 1.3   MONO % -- 11 11 12 11 11   MONO # -- 0.7 0.7 0.6 0.7 0.7   EO % -- 4 2 6 4 5   EO # -- 0.2 0.2 0.3 0.2 0.3   BASO % -- 1 1 1 1 1   BASO # -- 0.1 0.1 0.0 0.0 0.1   IMMATURE GRANULOCYTE % -- <1 <1 0.0 0.2 0.3   ABS IMM. GRANULOCYTE -- 0.01 0.02 0.00 0.01 0.02    AUTOMATED NRBC -- 0.0 0.0 0.0 0.0 0.0     Specimen: Serum  Component  Ref Range & Units 3 mo ago Comments   FERRITIN  21 - 267 NG/ML 11 Low  Elevated ferritin concentration can occur in iron overload or independently of   iron status in association with in     Specimen: Serum  Component  Ref Range & Units 3 mo ago Comments   IRON  40 - 150 UG/DL 83    TRANSFERRIN  180 - 329 MG/    CALC. IRON BIND CAP.  260 - 480 UG/ CALCULATED IBC (UG/ML) = TRANSFERRIN (MG/DL) X 1.4   IRON SATURATION  20 - 50 % 22 FESAT (%) = (IRON/(TRANSFERRIN X 1.4)) X 100   Resulting Agency [facility]    Specimen Collected: 10/30/23  4:37 PM    Performed by: [facility] Last Resulted: 10/30/23 10:19 PM   Received From: [facility]  Result Received: 02/02/24  2:41 PM     Assessment & Plan:   1. Iron deficiency anemia, unspecified iron deficiency anemia type    2. Sore throat    The patient presents with a history of burning posterior mouth/throat discomfort that appears to be improving.  Direct visualization by ENT was negative.  We discussed possibilities including symptoms related to gastroesophageal reflux, postnasal drip versus the burning mouth syndrome.  We discussed options to diagnose/treat symptoms due to reflux which would include a high-dose PPI trial over 4-8 weeks, endoscopy (may or may not be helpful unless erosive esophagitis is present) or 24-hour pH monitoring.  The iron deficiency is noted and could be related to chronic gastrointestinal blood loss secondary to dual antiplatelet therapy versus erosive esophagitis or large hiatal hernia.  Colonic lesions should be excluded as the patient's bowel preparation was suboptimal.  Family history of esophageal cancer noted.  Based on the iron deficiency and previous poor preparation, we have elected to proceed with an upper endoscopy and concurrent colonoscopy in the next several weeks.  This will be arranged following a split  dose TriLyte preparation (Suprep preparation was fair previously) and monitored anesthesia care.  The patient will hold his iron for 72 hours preceding the procedure and the prasugrel for 7 days preceding the procedure.  Aspirin will be continued.  Further recommendations pending results of the endoscopy and clinical course.        Meds This Visit:  Requested Prescriptions     Signed Prescriptions Disp Refills    PEG 3350-KCl-Na Bicarb-NaCl (TRILYTE) 420 g Oral Recon Soln 4000 mL 0     Sig: Take 4,000 mL (4 L total) by mouth one time for 1 dose.       Imaging & Referrals:  None

## 2024-02-02 NOTE — PATIENT INSTRUCTIONS
1.  Schedule colonoscopy and upper endoscopy for an iron deficiency anemia, history of polyps, family history of colon cancer and possible reflux following a split dose TriLyte preparation and monitored anesthesia care.  2.  Hold prasugrel for 7 days preceding the procedure.  3.  Hold iron for 72 hours preceding the procedure.

## 2024-02-02 NOTE — TELEPHONE ENCOUNTER
Scheduled for:  Colonoscopy 47951/[id number],Egd 45366  Provider Name:  Dr. Goodrich  Date:  5/7/2024  Location:[facility]  Sedation:  MAC  Time: 2:00 Pm  (Patient is aware arrival time is at 1:00 Pm )  Prep: Split dose  Trilyte , Egd - Npo 3 hours before prior to procedure   Meds/Allergies Reconciled?:  Physician Reviewed   Diagnosis with codes:  Gerd K21.9 , History of colon polyps Z86.010, Family h/o colon polyps Z83.71, Iron deficiency of Anemia D50.9  Was patient informed to call insurance with codes (Y/N):  Yes  Referral sent?:  Referral was sent at the time of electronic surgical scheduling.  [facility] or [facility] notified?:  I sent an electronic request to Endo Scheduling and received a confirmation today.  Medication Orders:  Pt is aware to NOT take iron pills, herbal meds and diet supplements for 7 days before exam. Also to NOT take any form of alcohol, recreational drugs and any forms of ED meds 24 hours before exam.   Hold prasugrel for 7 days preceding the procedure.   Hold iron for 72 hours preceding the procedure.    Misc Orders:  Patient was informed about the new cancellation policy for his/her procedure. Patient was also given a copy of the cancellation policy at the time of the appointment and verbalized understanding.      Further instructions given by staff:  I provide prep instructions to patient at the time of the appointment and reviewed date, time and location, patient verbalized that he understood and is aware to call if he has any questions.

## 2024-02-02 NOTE — TELEPHONE ENCOUNTER
Fyi;Rn's  Patient will be having Procedure on 5/7/2024 at Em @2:00 Pm and was placed on waiting lists for any sooner Cancellation   Please Call PCP or Endocrine for the following recommendation for Medications adjustment of Diabetic meds or insulin   Or Anti-coagulants or anti-platelet   Prior to Procedure  Per  recommendation below:    Hold prasugrel for 7 days preceding the procedure.   Hold iron for 72 hours preceding the procedure.

## 2024-02-23 NOTE — TELEPHONE ENCOUNTER
GI ,     Please advise on message from Dr. Kapoor below regarding a sooner procedure date if available and please call pt/spouse.    Please route back to GI RN's when pt is rescheduled so we can address blood thinner clearance prior to procedure.    Thank you.

## 2024-02-23 NOTE — TELEPHONE ENCOUNTER
Richie Kapoor,     Spoke with spouse Barbara (on ROSALINDA), patient's name/ verified.    Requesting if egd/colonoscopy can be done sooner, currently scheduled on 24.    She is concerned about pt's anemia, (last hgb on 23 was 14.1) and decrease in Iron dose.    Pt is also on a blood thinner (ASA & Plavix), wife is concerned, asking if hgb can be ordered, no s/s of bleeding.    Pt's bp is on the low side than his normal per wife, reviewed w/ wife that pt is on bp meds and she should contact pt's PCP w/ this concern, pt has no dizziness.    No weakness, fatigue, pallor, or any s/s of bleeding     I pend the hgb order.     Please advise.    Thank you.

## 2024-02-23 NOTE — TELEPHONE ENCOUNTER
Called back spouse, Barbara, patient's name/ verified.    Discussed entire message form Dr. Kapoor below.     Instructed spouse for pt to complete blood work.     Informed that our  will call them to  assist w/ rescheduling procedure to a sooner date if available.     Barbara verbalized understanding.

## 2024-02-23 NOTE — TELEPHONE ENCOUNTER
You can add the patient onto the endoscopy schedule on 3/25/2024 if there is availability or another day/time if there is a cancellation.  I have entered the orders for a repeat CBC and iron studies.  These results can also help guide the decision on urgency of the procedure based on results.

## 2024-02-25 LAB
% SATURATION: 39 % (CALC) (ref 20–48)
ABSOLUTE BASOPHILS: 33 CELLS/UL (ref 0–200)
ABSOLUTE EOSINOPHILS: 160 CELLS/UL (ref 15–500)
ABSOLUTE LYMPHOCYTES: 957 CELLS/UL (ref 850–3900)
ABSOLUTE MONOCYTES: 638 CELLS/UL (ref 200–950)
ABSOLUTE NEUTROPHILS: 3713 CELLS/UL (ref 1500–7800)
BASOPHILS: 0.6 %
EOSINOPHILS: 2.9 %
FERRITIN: 29 NG/ML (ref 24–380)
HEMATOCRIT: 39.9 % (ref 38.5–50)
HEMOGLOBIN: 13.1 G/DL (ref 13.2–17.1)
IRON BINDING CAPACITY: 322 MCG/DL (CALC) (ref 250–425)
IRON, TOTAL: 126 MCG/DL (ref 50–180)
LYMPHOCYTES: 17.4 %
MCH: 32.7 PG (ref 27–33)
MCHC: 32.8 G/DL (ref 32–36)
MCV: 99.5 FL (ref 80–100)
MONOCYTES: 11.6 %
MPV: 11.1 FL (ref 7.5–12.5)
NEUTROPHILS: 67.5 %
PLATELET COUNT: 199 THOUSAND/UL (ref 140–400)
RDW: 12.3 % (ref 11–15)
RED BLOOD CELL COUNT: 4.01 MILLION/UL (ref 4.2–5.8)
WHITE BLOOD CELL COUNT: 5.5 THOUSAND/UL (ref 3.8–10.8)

## 2024-02-26 NOTE — TELEPHONE ENCOUNTER
PPD-    Patient colonoscopy [id number] EGD [id number] is now scheduled on 3/25/2024 at [facility] with Kp Aquino dx codes  Gerd K21.9 , History of colon polyps Z86.010, Family h/o colon polyps Z83.71, Iron deficiency of Anemia D50.9     Thanks!

## 2024-02-26 NOTE — TELEPHONE ENCOUNTER
Rescheduled for:  Colonoscopy [id number],Egd 64257  Provider Name:  Dr. Goodrich  Date: FROM 5/7/2024 TO 3/25/2024  Location:[facility]  Sedation:  MAC  Time: FROM 2:00 Pm  TO 3pm (Patient is aware arrival time is at 200 Pm )  Prep: Split dose  Trilyte , Egd - Npo 3 hours before prior to procedure   Meds/Allergies Reconciled?:  Physician Reviewed   Diagnosis with codes:  Gerd K21.9 , History of colon polyps Z86.010, Family h/o colon polyps Z83.71, Iron deficiency of Anemia D50.9  Was patient informed to call insurance with codes (Y/N):  Yes  Referral sent?:  Referral was sent at the time of electronic surgical scheduling.  [facility] or [facility] notified?:  I sent an electronic request to Endo Scheduling and received a confirmation today.  Medication Orders:  Pt is aware to NOT take iron pills, herbal meds and diet supplements for 7 days before exam. Also to NOT take any form of alcohol, recreational drugs and any forms of ED meds 24 hours before exam.   Hold prasugrel for 7 days preceding the procedure.   Hold iron for 72 hours preceding the procedure.     Misc Orders:  Patient was informed about the new cancellation policy for his/her procedure. Patient was also given a copy of the cancellation policy at the time of the appointment and verbalized understanding.      Further instructions given by staff:  I provide prep instructions to patient at the time of the appointment and reviewed date, time and location, patient verbalized that he understood and is aware to call if he has any questions.

## 2024-03-01 ENCOUNTER — TELEPHONE (OUTPATIENT)
Facility: CLINIC | Age: 79
End: 2024-03-01

## 2024-03-01 NOTE — TELEPHONE ENCOUNTER
Dr Kapoor    I spoke to the patient's wife, Barbara (on ROSALINDA), patient /name verified.    She wanted to know your opinion regarding the iron studies.    Resulted posted in Epic.    Thank you

## 2024-03-01 NOTE — TELEPHONE ENCOUNTER
Called the patient, I spoke to his wife, Barbara (on ROSALINDA), patient /name verified.    Instructed to let the patient hold the ff:    1) prasugrel for 7 days, she stated Dr LANDON Jerry changed his AC to plavix, will obtain order from cardiologist  2) hold iron 3 days prior to procedure.    Barbara verbalized agreement and understanding.

## 2024-03-01 NOTE — TELEPHONE ENCOUNTER
Called and spoke to the patient's spouse, Barbara, patient /name verified.    All questions regarding the blood thinner  was answered to her satisfaction.

## 2024-03-02 NOTE — TELEPHONE ENCOUNTER
I was unable to reach Barbara, however, I spoke to Albert.  He feels well and continues to play tennis.  His hemoglobin has dropped slightly to 13.1.  Iron levels, however, have improved and are normal.  Significance of the slight hemoglobin drop is uncertain.  This could should continue to be trended.  I would proceed with endoscopic evaluation as scheduled on 3/25/2024.

## 2024-03-14 NOTE — PAT NURSING NOTE
Patient called for PAT regarding upcoming colonoscopy/EGD with Dr. Goodrich on 03/25/24. Pt states his blood thinners recently changed- he is taking Plavix, not Prasugrel. Patient informed to call Dr. Goodrich office to confirm when he needs to stop taking his Plavix. Patient verbalized understanding.

## 2024-03-24 ENCOUNTER — ANESTHESIA EVENT (OUTPATIENT)
Dept: ENDOSCOPY | Facility: HOSPITAL | Age: 79
End: 2024-03-24
Payer: MEDICARE

## 2024-03-25 ENCOUNTER — ANESTHESIA (OUTPATIENT)
Dept: ENDOSCOPY | Facility: HOSPITAL | Age: 79
End: 2024-03-25
Payer: MEDICARE

## 2024-03-25 ENCOUNTER — HOSPITAL ENCOUNTER (OUTPATIENT)
Facility: HOSPITAL | Age: 79
Setting detail: HOSPITAL OUTPATIENT SURGERY
Discharge: HOME | End: 2024-03-25
Attending: INTERNAL MEDICINE | Admitting: INTERNAL MEDICINE
Payer: MEDICARE

## 2024-03-25 VITALS
DIASTOLIC BLOOD PRESSURE: 75 MMHG | HEART RATE: 53 BPM | SYSTOLIC BLOOD PRESSURE: 134 MMHG | RESPIRATION RATE: 14 BRPM | HEIGHT: 76 IN | OXYGEN SATURATION: 97 % | BODY MASS INDEX: 26.79 KG/M2 | WEIGHT: 220 LBS

## 2024-03-25 DIAGNOSIS — D50.9 IRON DEFICIENCY ANEMIA, UNSPECIFIED IRON DEFICIENCY ANEMIA TYPE: ICD-10-CM

## 2024-03-25 DIAGNOSIS — Z83.719 FAMILY HX COLONIC POLYPS: ICD-10-CM

## 2024-03-25 DIAGNOSIS — K21.9 GASTROESOPHAGEAL REFLUX DISEASE WITHOUT ESOPHAGITIS: ICD-10-CM

## 2024-03-25 DIAGNOSIS — Z86.010 HISTORY OF COLON POLYPS: ICD-10-CM

## 2024-03-25 PROCEDURE — 45385 COLONOSCOPY W/LESION REMOVAL: CPT | Performed by: INTERNAL MEDICINE

## 2024-03-25 PROCEDURE — 43239 EGD BIOPSY SINGLE/MULTIPLE: CPT | Performed by: INTERNAL MEDICINE

## 2024-03-25 PROCEDURE — 0DB68ZX EXCISION OF STOMACH, VIA NATURAL OR ARTIFICIAL OPENING ENDOSCOPIC, DIAGNOSTIC: ICD-10-PCS | Performed by: INTERNAL MEDICINE

## 2024-03-25 PROCEDURE — 0DB48ZX EXCISION OF ESOPHAGOGASTRIC JUNCTION, VIA NATURAL OR ARTIFICIAL OPENING ENDOSCOPIC, DIAGNOSTIC: ICD-10-PCS | Performed by: INTERNAL MEDICINE

## 2024-03-25 PROCEDURE — 0DB98ZX EXCISION OF DUODENUM, VIA NATURAL OR ARTIFICIAL OPENING ENDOSCOPIC, DIAGNOSTIC: ICD-10-PCS | Performed by: INTERNAL MEDICINE

## 2024-03-25 PROCEDURE — 0DBL8ZX EXCISION OF TRANSVERSE COLON, VIA NATURAL OR ARTIFICIAL OPENING ENDOSCOPIC, DIAGNOSTIC: ICD-10-PCS | Performed by: INTERNAL MEDICINE

## 2024-03-25 PROCEDURE — 0DBM8ZX EXCISION OF DESCENDING COLON, VIA NATURAL OR ARTIFICIAL OPENING ENDOSCOPIC, DIAGNOSTIC: ICD-10-PCS | Performed by: INTERNAL MEDICINE

## 2024-03-25 RX ORDER — SODIUM CHLORIDE, SODIUM LACTATE, POTASSIUM CHLORIDE, CALCIUM CHLORIDE 600; 310; 30; 20 MG/100ML; MG/100ML; MG/100ML; MG/100ML
INJECTION, SOLUTION INTRAVENOUS CONTINUOUS
Status: DISCONTINUED | OUTPATIENT
Start: 2024-03-25 | End: 2024-03-25

## 2024-03-25 RX ORDER — LIDOCAINE HYDROCHLORIDE 10 MG/ML
INJECTION, SOLUTION EPIDURAL; INFILTRATION; INTRACAUDAL; PERINEURAL AS NEEDED
Status: DISCONTINUED | OUTPATIENT
Start: 2024-03-25 | End: 2024-03-25 | Stop reason: SURG

## 2024-03-25 RX ORDER — NALOXONE HYDROCHLORIDE 0.4 MG/ML
0.08 INJECTION, SOLUTION INTRAMUSCULAR; INTRAVENOUS; SUBCUTANEOUS ONCE AS NEEDED
Status: DISCONTINUED | OUTPATIENT
Start: 2024-03-25 | End: 2024-03-25

## 2024-03-25 RX ADMIN — SODIUM CHLORIDE, SODIUM LACTATE, POTASSIUM CHLORIDE, CALCIUM CHLORIDE: 600; 310; 30; 20 INJECTION, SOLUTION INTRAVENOUS at 15:19:00

## 2024-03-25 RX ADMIN — LIDOCAINE HYDROCHLORIDE 25 MG: 10 INJECTION, SOLUTION EPIDURAL; INFILTRATION; INTRACAUDAL; PERINEURAL at 15:23:00

## 2024-03-25 NOTE — ANESTHESIA POSTPROCEDURE EVALUATION
Patient: Albert Marks Jr.    Procedure Summary       Date: 03/25/24 Room / Location: [facility] ENDOSCOPY 01 / [facility] ENDOSCOPY    Anesthesia Start: 1518 Anesthesia Stop: 1621    Procedures:       COLONOSCOPY      ESOPHAGOGASTRODUODENOSCOPY (EGD) Diagnosis:       Gastroesophageal reflux disease without esophagitis      History of colon polyps      Family hx colonic polyps      Iron deficiency anemia, unspecified iron deficiency anemia type      (diverticulosis; colon polyps; gastric erosion)    Surgeons: Antwon Goodrich MD Anesthesiologist: Janette Hernandez MD    Anesthesia Type: MAC ASA Status: 3            Anesthesia Type: MAC    Vitals Value Taken Time   BP 93/84 03/25/24 1625   Temp 98 03/25/24 1630   Pulse 53 03/25/24 1629   Resp 12 03/25/24 1629   SpO2 99 % 03/25/24 1629   Vitals shown include unfiled device data.    [facility] AN Post Evaluation:   Patient Evaluated in PACU  Patient Participation: complete - patient participated  Level of Consciousness: awake and alert  Pain Score: 0  Pain Management: adequate  Airway Patency:patent  Dental exam unchanged from preop  Yes    Nausea/Vomiting: none  Cardiovascular Status: hemodynamically stable  Respiratory Status: spontaneous ventilation and room air  Postoperative Hydration balanced      Janette Hernandez MD  3/25/2024 4:30 PM

## 2024-03-25 NOTE — OPERATIVE REPORT
[facility] Endoscopy Report      Date of Procedure:  03/25/24      Preoperative Diagnosis:  1.  Iron deficiency anemia  2.  Personal history of adenomatous colon polyps  3.  Family history of colon cancer      Postoperative Diagnosis:  1.  Colon polyps  2.  Sigmoid colon diverticulosis  3.  Gastric erosion      Procedure:    Colonoscopy with polypectomy  Esophagogastroduodenoscopy with biopsy      Surgeon:  Antwon Goodrich M.D.      Anesthesia:  Monitored anesthesia care  Cecal withdrawal time: 20 minutes  EBL:  Insignificant      Brief History:  This is a 78 year old male who has a family history of colon cancer in his father at the age of 67 and a personal history of adenomatous colon polyps.  The patient's last colonoscopy in July 2023 revealed #7 adenomatous polyps including an 11 mm adenoma.  The colonoscopic preparation was fair.  A short interval colonoscopy was advised.  The patient has also noted recent ENT symptoms.  He has been found to have an iron deficiency anemia that has responded to iron supplementation.  He is on dual antiplatelet therapy.  A concurrent upper endoscopy is being performed as well.      Technique:  After informed consent, the patient was placed in the left lateral recumbent position.  Digital rectal examination revealed no palpable intraluminal abnormalities.  An Olympus variable stiffness 190 series HD colonoscope was inserted into the rectum and advanced under direct vision by following the lumen to the terminal ileum.  The colon was examined upon withdrawal in the left lateral recumbent position.    Following colonoscopy, an Olympus adult HD gastroscope was inserted into the hypopharynx and advanced under direct vision into the esophagus, stomach and duodenum.  The endoscope was withdrawn to the stomach where retroflexion of the angulus, body, cardia and fundus was performed.  The instrument was straightened, insufflated air and fluid were suctioned and the  endoscope was withdrawn.  The procedures were well tolerated without immediate complication.      Findings:  The preparation of the colon was very good.  A brief glimpse of the terminal ileum was normal.  The ileocecal valve was well-preserved.  The visualized colonic mucosa from the cecum to the anal verge was normal with an intact vascular pattern.  There were #3 polyps seen within the colon which removed as follows:    1.  In the transverse colon there were #2 2-4 mm sessile polyps which were cold snare excised and retrieved.  2.  In the descending colon there was a 3 mm sessile polyp which was cold snare excised and retrieved.    Inspection of all sites revealed no evidence of ongoing bleeding.  There were multiple diverticula seen in the sigmoid colon without current signs of complication.  There were no other colonic polyps, mass lesions, vascular anomalies or signs of inflammation seen.  Retroflexion in the rectum revealed no abnormalities.    The esophagus was normal without evidence of ulceration, erosion, stricture, ring or Chaney's esophagus.  The GE junction and diaphragmatic impression were at 43/44 cm.  No definite hiatal hernia was seen on this study.  There was velvety tissue immediately distal to the junction in the cardia and intestinal metaplasia in this area cannot be excluded.  Biopsies were obtained.  The stomach distended appropriately with insufflated air.  The mucosa of the stomach had somewhat of a pale and atrophic appearance with attenuation of rugae.    There was an approximately 1.4 cm thin linear erosion/superficial ulceration along the greater curvature.  Biopsies were obtained.  Biopsies were obtained from the gastric antrum and placed in a separate container.  The duodenal bulb and post bulbar regions were normal with a normal villous pattern.  Biopsies from the duodenum were obtained.      Impression:  1.  Diminutive colon polyps  2.  Uncomplicated sigmoid colon diverticulosis  3.   Gastric erosions/superficial ulceration  4.  Rule out atrophic gastritis    Recommendations:  1.  Follow-up biopsy results.  2.  Further recommendations pending biopsy and clinical course          Antwon Goodrich MD  3/25/2024

## 2024-03-25 NOTE — DISCHARGE INSTRUCTIONS
Home Care Instructions for Colonoscopy and/or Gastroscopy with Sedation    Diet:  - Resume your regular diet as tolerated unless otherwise instructed.  - Start with light meals to minimize bloating.  - Do not drink alcohol today.    Medication:    - Resume PLAVIX tomorrow      Activities:  - Take it easy today. Do not return to work today.  - Do not drive today.  - Do not operate any machinery today (including kitchen equipment).    Colonoscopy:  - You may notice some rectal \"spotting\" (a little blood on the toilet tissue) for a day or two after the exam. This is normal.  - If you experience any rectal bleeding (not spotting), persistent tenderness or sharp severe abdominal pains, oral temperature over 100 degrees Fahrenheit, light-headedness or dizziness, or any other problems, contact your doctor.    Gastroscopy:  - You may have a sore throat for 2-3 days following the exam. This is normal. Gargling with warm salt water (1/2 tsp salt to 1 glass warm water) or using throat lozenges will help.  - If you experience any sharp pain in your neck, abdomen or chest, vomiting of blood, oral temperature over 100 degrees Fahrenheit, light-headedness or dizziness, or any other problems, contact your doctor.    **If unable to reach your doctor, please go to the [facility] Emergency Room**    - Your referring physician will receive a full report of your examination.  - If you do not hear from your doctor's office within two weeks of your biopsy, please call them for your results.    You may be able to see your laboratory results in [employer] between 4 and 7 business days.  In some cases, your physician may not have viewed the results before they are released to [employer].  If you have questions regarding your results contact the physician who ordered the test/exam by phone or via [employer] by choosing \"Ask a Medical Question.\"

## 2024-03-25 NOTE — H&P
History & Physical Examination    Patient Name: Albert Marks Jr.  MRN: [id number]  [facility]: [id number]  YOB: 1945    Diagnosis: Personal history of colon polyps, iron deficiency anemia, gastroesophageal reflux      Medications Prior to Admission   Medication Sig Dispense Refill Last Dose    Ferrous Sulfate 325 (65 Fe) MG Oral Tab Take 1 tablet (325 mg total) by mouth 3 (three) times a week at 1600.   3/20/2024    clopidogrel 75 MG Oral Tab Take 1 tablet (75 mg total) by mouth daily.   3/20/2024    amLODIPine 5 MG Oral Tab Take 1 tablet (5 mg total) by mouth daily.   3/25/2024    chlorthalidone 25 MG Oral Tab Take 1 tablet (25 mg total) by mouth daily.   3/25/2024    metoprolol succinate  MG Oral Tablet 24 Hr Take 1 tablet (100 mg total) by mouth daily.   3/25/2024    aspirin 81 MG Oral Tab EC Take 1 tablet (81 mg total) by mouth daily.   3/24/2024    atorvastatin 80 MG Oral Tab Take 1 tablet (80 mg total) by mouth daily.   3/25/2024    Levothyroxine Sodium 75 MCG Oral Tab Take 1 tablet (75 mcg total) by mouth before breakfast.   3/25/2024    losartan Potassium 50 MG Oral Tab Take 1 tablet (50 mg total) by mouth daily.   3/25/2024    [] PEG 3350-KCl-Na Bicarb-NaCl (TRILYTE) 420 g Oral Recon Soln Take 4,000 mL (4 L total) by mouth one time for 1 dose. 4000 mL 0     ketoconazole 2 % External Shampoo Apply 1 Application topically daily.       triamcinolone 0.5 % External Cream Apply 1 Application topically in the morning and 1 Application before bedtime.       Prasugrel HCl 10 MG Oral Tab Take 1 tablet (10 mg total) by mouth daily. (Patient not taking: Reported on 3/14/2024)   Not Taking     Current Facility-Administered Medications   Medication Dose Route Frequency    lactated ringers infusion   Intravenous Continuous       Allergies:   Allergies   Allergen Reactions    Dust Mite Extract OTHER (SEE COMMENTS)    Adhesive Tape RASH    Latex RASH       Past Medical History:   Diagnosis Date     Anesthesia complication     in 2014, pt states he was put under for a hernia surgery and had a heart attack    Back problem     Colon polyp     polyps removed last visit    Constipation a year    Disorder of thyroid     Essential hypertension     being treated    Gastrointestinal bleeding one episode 5 yrs ago    Hearing impairment     Heart attack (HCC)     Heart disease MI 2014, 3 stents,    High blood pressure     High cholesterol     History of blood clots     in heart per patient    Hyperlipidemia     being treated    Pulmonary embolism (HCC)     per pt, it was in the heart     Past Surgical History:   Procedure Laterality Date    ANGIOPLASTY (CORONARY)  2014, 3 stents    CATH BARE METAL STENT (BMS)  2015    2 coronary stents    CATH PERCUTANEOUS  TRANSLUMINAL CORONARY ANGIOPLASTY      COLONOSCOPY  last one, 4 years ago    COLONOSCOPY N/A 7/11/2023    Procedure: COLONOSCOPY;  Surgeon: Antwon Goodrich MD;  Location: [facility] ENDOSCOPY    HERNIA SURGERY       Family History   Problem Relation Age of Onset    Cancer Father         Colon cancer    No Known Problems Mother      Social History     Tobacco Use    Smoking status: Never    Smokeless tobacco: Never   Substance Use Topics    Alcohol use: Yes     Alcohol/week: 1.0 standard drink of alcohol     Types: 1 Glasses of wine per week     Comment: rare       SYSTEM Check if Review is Normal Check if Physical Exam is Normal If not normal, please explain:   HEENT [X ] [ X]    NECK  [X ] [ X]    HEART [X ] [ X]    LUNGS [X ] [ X]    ABDOMEN [X ] [ X]    EXTREMITIES [X ] [ X]    OTHER        [ x ] I have discussed the risks and benefits and alternatives with the patient/family.  They understand and agree to proceed with plan of care.  [ x ] I have reviewed the History and Physical done within the last 30 days.  Any changes noted above.    Antwon Goodrich MD  3/25/2024  3:23 PM

## 2024-03-25 NOTE — ANESTHESIA PREPROCEDURE EVALUATION
Anesthesia PreOp Note    HPI:     Albert Marks Jr. is a 78 year old male who presents for preoperative consultation requested by: Antwon Goodrich MD    Date of Surgery: 3/25/2024    Procedure(s):  COLONOSCOPY/ ESOPHAGOGASTRODUODENOSCOPY  ESOPHAGOGASTRODUODENOSCOPY (EGD)  Indication: Gastroesophageal reflux disease without esophagitis/ History of colon polyps/ Family hx colonic polyps/ Iron deficiency anemia, unspecified iron deficiency anemia type    Relevant Problems   No relevant active problems       NPO:  Last Liquid Consumption Date: 03/25/24  Last Liquid Consumption Time: 1200  Last Solid Consumption Date: 03/24/24  Last Solid Consumption Time: 1000  Last Liquid Consumption Date: 03/25/24          History Review:  Patient Active Problem List    Diagnosis Date Noted    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 06/02/2020       Past Medical History:   Diagnosis Date    Anesthesia complication     in 2014, pt states he was put under for a hernia surgery and had a heart attack    Back problem     Colon polyp     polyps removed last visit    Constipation a year    Disorder of thyroid     Essential hypertension     being treated    Gastrointestinal bleeding one episode 5 yrs ago    Hearing impairment     Heart attack (HCC)     Heart disease MI 2014, 3 stents,    High blood pressure     High cholesterol     History of blood clots     in heart per patient    Hyperlipidemia     being treated    Pulmonary embolism (HCC)     per pt, it was in the heart       Past Surgical History:   Procedure Laterality Date    ANGIOPLASTY (CORONARY)  2014, 3 stents    CATH BARE METAL STENT (BMS)  2015    2 coronary stents    CATH PERCUTANEOUS  TRANSLUMINAL CORONARY ANGIOPLASTY      COLONOSCOPY  last one, 4 years ago    COLONOSCOPY N/A 7/11/2023    Procedure: COLONOSCOPY;  Surgeon: Antwon Goodrich MD;  Location: [facility] ENDOSCOPY    HERNIA SURGERY         Medications Prior to Admission   Medication Sig Dispense Refill  Last Dose    Ferrous Sulfate 325 (65 Fe) MG Oral Tab Take 1 tablet (325 mg total) by mouth 3 (three) times a week at 1600.   3/20/2024    clopidogrel 75 MG Oral Tab Take 1 tablet (75 mg total) by mouth daily.   3/20/2024    amLODIPine 5 MG Oral Tab Take 1 tablet (5 mg total) by mouth daily.   3/25/2024    chlorthalidone 25 MG Oral Tab Take 1 tablet (25 mg total) by mouth daily.   3/25/2024    metoprolol succinate  MG Oral Tablet 24 Hr Take 1 tablet (100 mg total) by mouth daily.   3/25/2024    aspirin 81 MG Oral Tab EC Take 1 tablet (81 mg total) by mouth daily.   3/24/2024    atorvastatin 80 MG Oral Tab Take 1 tablet (80 mg total) by mouth daily.   3/25/2024    Levothyroxine Sodium 75 MCG Oral Tab Take 1 tablet (75 mcg total) by mouth before breakfast.   3/25/2024    losartan Potassium 50 MG Oral Tab Take 1 tablet (50 mg total) by mouth daily.   3/25/2024    [] PEG 3350-KCl-Na Bicarb-NaCl (TRILYTE) 420 g Oral Recon Soln Take 4,000 mL (4 L total) by mouth one time for 1 dose. 4000 mL 0     ketoconazole 2 % External Shampoo Apply 1 Application topically daily.       triamcinolone 0.5 % External Cream Apply 1 Application topically in the morning and 1 Application before bedtime.       Prasugrel HCl 10 MG Oral Tab Take 1 tablet (10 mg total) by mouth daily. (Patient not taking: Reported on 3/14/2024)   Not Taking     Current Facility-Administered Medications Ordered in Epic   Medication Dose Route Frequency Provider Last Rate Last Admin    lactated ringers infusion   Intravenous Continuous Antwon Goodrich MD 20 mL/hr at 24 1445 New Bag at 24 1445     No current [facility]-ordered outpatient medications on file.       Allergies   Allergen Reactions    Dust Mite Extract OTHER (SEE COMMENTS)    Adhesive Tape RASH    Latex RASH       Family History   Problem Relation Age of Onset    Cancer Father         Colon cancer    No Known Problems Mother      Social History     Socioeconomic History     Marital status:    Tobacco Use    Smoking status: Never    Smokeless tobacco: Never   Vaping Use    Vaping Use: Never used   Substance and Sexual Activity    Alcohol use: Yes     Alcohol/week: 1.0 standard drink of alcohol     Types: 1 Glasses of wine per week     Comment: rare    Drug use: Not Currently       Available pre-op labs reviewed.  Lab Results   Component Value Date    WBC 5.5 02/24/2024    RBC 4.01 (L) 02/24/2024    HGB 13.1 (L) 02/24/2024    HCT 39.9 02/24/2024    MCV 99.5 02/24/2024    MCH 32.7 02/24/2024    MCHC 32.8 02/24/2024    RDW 12.3 02/24/2024     02/24/2024             Vital Signs:  Body mass index is 26.78 kg/m².   height is 1.93 m (6' 4\") and weight is 99.8 kg (220 lb). His blood pressure is 149/70 and his pulse is 59. His respiration is 14 and oxygen saturation is 97%.   Vitals:    03/14/24 1653 03/25/24 1436   BP:  149/70   Pulse:  59   Resp:  14   SpO2:  97%   Weight: 99.8 kg (220 lb) 99.8 kg (220 lb)   Height: 1.93 m (6' 4\") 1.93 m (6' 4\")        Anesthesia Evaluation      Airway   Mallampati: II  TM distance: >3 FB  Neck ROM: full  Dental - Dentition appears grossly intact     Pulmonary     breath sounds clear to auscultation  (+) shortness of breath  Cardiovascular   (+) hypertension, CABG/stent    Rhythm: regular  Rate: normal    Neuro/Psych      GI/Hepatic/Renal      Endo/Other    Abdominal     Abdomen: soft.                 Anesthesia Plan:   ASA:  3  Plan:   MAC  Informed Consent Plan and Risks Discussed With:  Patient  Discussed plan with:  Surgeon      I have informed Albert Marks Jr. and/or legal guardian or family member of the nature of the anesthetic plan, benefits, risks including possible dental damage if relevant, major complications, and any alternative forms of anesthetic management.   All of the patient's questions were answered to the best of my ability. The patient desires the anesthetic management as planned.  Janette Hernandez MD  3/25/2024 3:09  PM  Present on Admission:  **None**

## 2024-03-28 ENCOUNTER — MED REC SCAN ONLY (OUTPATIENT)
Facility: CLINIC | Age: 79
End: 2024-03-28

## 2024-03-29 ENCOUNTER — TELEPHONE (OUTPATIENT)
Facility: CLINIC | Age: 79
End: 2024-03-29

## 2024-03-29 NOTE — TELEPHONE ENCOUNTER
----- Message from Antwon Goodrich MD sent at 3/28/2024  6:41 PM CDT -----  I spoke to the patient's wife.  Albert is feeling well and back to work.  He had #3 subcentimeter tubular adenomas removed.  I discussed the significance.  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  The remainder of the gastric biopsies were negative for intestinal metaplasia.  The duodenal biopsies were normal.  I discussed that intestinal metaplasia of the gastric cardia is a low risk lesion.  A surveillance EGD and colonoscopy could be considered in 3 years depending on patient preference, functional status and comorbidities.  They will contact me with any symptoms of dysphagia, upper abdominal pain, anorexia, weight loss or frequent heartburn.    GI RNs: Please enter colonoscopy and EGD recalls for 3 years.

## 2024-03-29 NOTE — TELEPHONE ENCOUNTER
Health maintenance updated. Last colonoscopy/egd done 3/25/24. 3 year recall placed into Pt Outreach, next due on 03/2027 per Dr. Goodrich. Specialty comments updated.

## (undated) DEVICE — SUCTION CANISTER, 3000CC,SAFELINER: Brand: DEROYAL

## (undated) DEVICE — 60 ML SYRINGE REGULAR TIP: Brand: MONOJECT

## (undated) DEVICE — SUTURE ETHILON 6-0 P-3

## (undated) DEVICE — KIT ENDO ORCAPOD 160/180/190

## (undated) DEVICE — MEDI-VAC NON-CONDUCTIVE SUCTION TUBING: Brand: CARDINAL HEALTH

## (undated) DEVICE — CONMED SCOPE SAVER BITE BLOCK, 20X27 MM: Brand: SCOPE SAVER

## (undated) DEVICE — SNARE CAPTIFLEX MICRO-OVL OLY

## (undated) DEVICE — FORCEP RADIAL JAW 4

## (undated) DEVICE — KIT CLEAN ENDOKIT 1.1OZ GOWNX2

## (undated) DEVICE — ENCORE® LATEX ACCLAIM SIZE 8.5, STERILE LATEX POWDER-FREE SURGICAL GLOVE: Brand: ENCORE

## (undated) DEVICE — PEN 6\" SURGICAL MARKING PURPL

## (undated) DEVICE — Device: Brand: DUAL NARE NASAL CANNULAE FEMALE LUER CON 7FT O2 TUBE

## (undated) DEVICE — LASSO POLYPECTOMY SNARE: Brand: LASSO

## (undated) DEVICE — SNARE OPTMZ PLPCTM TRP

## (undated) DEVICE — SUTURE SILK 4-0 P-3

## (undated) DEVICE — SUTURE MONOCRY 5-0 Y834G

## (undated) DEVICE — MEDI-VAC NON-CONDUCTIVE SUCTION TUBING 6MM X 1.8M (6FT.) L: Brand: CARDINAL HEALTH

## (undated) DEVICE — OUTPATIENT: Brand: MEDLINE INDUSTRIES, INC.

## (undated) DEVICE — NON-ADHERENT STRIPS,OIL EMULSION: Brand: CURITY

## (undated) DEVICE — TRAY SKIN PREP PVP-1

## (undated) DEVICE — DRAPE SRG 50X36IN HD TRBN STRL

## (undated) DEVICE — SOL  .9 1000ML BTL

## (undated) DEVICE — YANKAUER,BULB TIP,W/O VENT,RIGID,STERILE: Brand: MEDLINE

## (undated) DEVICE — GIJAW SINGLE-USE BIOPSY FORCEPS WITH NEEDLE: Brand: GIJAW

## (undated) NOTE — LETTER
[facility]  [address], [place], [place]  Authorization for Surgical Operation and Procedure                                                                                           I hereby authorize Antwon Goodrich MD, my physician and his/her assistants (if applicable), which may include medical students, residents, and/or fellows, to perform the following surgical operation/ procedure and administer such anesthesia as may be determined necessary by my physician: Operation/Procedure name (s) COLONOSCOPY/ ESOPHAGOGASTRODUODENOSCOPY on Albert Deven Kendrick Spicer   2.   I recognize that during the surgical operation/procedure, unforeseen conditions may necessitate additional or different procedures than those listed above.  I, therefore, further authorize and request that the above-named surgeon, assistants, or designees perform such procedures as are, in their judgment, necessary and desirable.    3.   My surgeon/physician has discussed prior to my surgery the potential benefits, risks and side effects of this procedure; the likelihood of achieving goals; and potential problems that might occur during recuperation.  They also discussed reasonable alternatives to the procedure, including risks, benefits, and side effects related to the alternatives and risks related to not receiving this procedure.  I have had all my questions answered and I acknowledge that no guarantee has been made as to the result that may be obtained.    4.   Should the need arise during my operation/procedure, which includes change of level of care prior to discharge, I also consent to the administration of blood and/or blood products.  Further, I understand that despite careful testing and screening of blood or blood products by collecting agencies, I may still be subject to ill effects as a result of receiving a blood transfusion and/or blood products.  The following are some, but not all, of the potential risks  that can occur: fever and allergic reactions, hemolytic reactions, transmission of diseases such as Hepatitis, AIDS and Cytomegalovirus (CMV) and fluid overload.  In the event that I wish to have an autologous transfusion of my own blood, or a directed donor transfusion, I will discuss this with my physician.  Check only if Refusing Blood or Blood Products  I understand refusal of blood or blood products as deemed necessary by my physician may have serious consequences to my condition to include possible death. I hereby assume responsibility for my refusal and release the hospital, its personnel, and my physicians from any responsibility for the consequences of my refusal.    o  Refuse   5.   I authorize the use of any specimen, organs, tissues, body parts or foreign objects that may be removed from my body during the operation/procedure for diagnosis, research or teaching purposes and their subsequent disposal by hospital authorities.  I also authorize the release of specimen test results and/or written reports to my treating physician on the hospital medical staff or other referring or consulting physicians involved in my care, at the discretion of the Pathologist or my treating physician.    6.   I consent to the photographing or videotaping of the operations or procedures to be performed, including appropriate portions of my body for medical, scientific, or educational purposes, provided my identity is not revealed by the pictures or by descriptive texts accompanying them.  If the procedure has been photographed/videotaped, the surgeon will obtain the original picture, image, videotape or CD.  The hospital will not be responsible for storage, release or maintenance of the picture, image, tape or CD.    7.   I consent to the presence of a  or observers in the operating room as deemed necessary by my physician or their designees.    8.   I recognize that in the event my procedure results in  extended X-Ray/fluoroscopy time, I may develop a skin reaction.    9. If I have a Do Not Attempt Resuscitation (DNAR) order in place, that status will be suspended while in the operating room, procedural suite, and during the recovery period unless otherwise explicitly stated by me (or a person authorized to consent on my behalf). The surgeon or my attending physician will determine when the applicable recovery period ends for purposes of reinstating the DNAR order.  10. Patients having a sterilization procedure: I understand that if the procedure is successful the results will be permanent and it will therefore be impossible for me to inseminate, conceive, or bear children.  I also understand that the procedure is intended to result in sterility, although the result has not been guaranteed.   11. I acknowledge that my physician has explained sedation/analgesia administration to me including the risk and benefits I consent to the administration of sedation/analgesia as may be necessary or desirable in the judgment of my physician.    I CERTIFY THAT I HAVE READ AND FULLY UNDERSTAND THE ABOVE CONSENT TO OPERATION and/or OTHER PROCEDURE.     _________________________________________ _________________________________     ___________________________________  Signature of Patient     Signature of Responsible Person                   Printed Name of Responsible Person                              _________________________________________ ______________________________        ___________________________________  Signature of Witness         Date  Time         Relationship to Patient    STATEMENT OF PHYSICIAN My signature below affirms that prior to the time of the procedure; I have explained to the patient and/or his/her legal representative, the risks and benefits involved in the proposed treatment and any reasonable alternative to the proposed treatment. I have also explained the risks and benefits involved in refusal of  the proposed treatment and alternatives to the proposed treatment and have answered the patient's questions. If I have a significant financial interest in a co-management agreement or a significant financial interest in any product or implant, or other significant relationship used in this procedure/surgery, I have disclosed this and had a discussion with my patient.     _______________________________________________________________ _____________________________  (Signature of Physician)                                                                                         (Date)                                   (Time)  Patient Name: Albertluis a Marks Jr.    : 1945   Printed: 3/20/2024      Medical Record #: [id number]                                              Page 1 of 1

## (undated) NOTE — LETTER
[address]  [address] [place], [place]  Authorization for Surgical Operation and Procedure                                                                                           I hereby authorize Nickolas Bartholomew MD, my physician and his/her assistants (if applicable), which may include medical students, residents, and/or fellows, to perform the following surgical operation/ procedure and administer such anesthesia as may be determined necessary by my physician: Operation/Procedure name (s) COLONOSCOPY on Bibiana Candle.   2.   I recognize that during the surgical operation/procedure, unforeseen conditions may necessitate additional or different procedures than those listed above. I, therefore, further authorize and request that the above-named surgeon, assistants, or designees perform such procedures as are, in their judgment, necessary and desirable. 3.   My surgeon/physician has discussed prior to my surgery the potential benefits, risks and side effects of this procedure; the likelihood of achieving goals; and potential problems that might occur during recuperation. They also discussed reasonable alternatives to the procedure, including risks, benefits, and side effects related to the alternatives and risks related to not receiving this procedure. I have had all my questions answered and I acknowledge that no guarantee has been made as to the result that may be obtained. 4.   Should the need arise during my operation/procedure, which includes change of level of care prior to discharge, I also consent to the administration of blood and/or blood products. Further, I understand that despite careful testing and screening of blood or blood products by collecting agencies, I may still be subject to ill effects as a result of receiving a blood transfusion and/or blood products.   The following are some, but not all, of the potential risks that can occur: fever and allergic reactions, hemolytic reactions, transmission of diseases such as Hepatitis, AIDS and Cytomegalovirus (CMV) and fluid overload. In the event that I wish to have an autologous transfusion of my own blood, or a directed donor transfusion, I will discuss this with my physician. Check only if Refusing Blood or Blood Products  I understand refusal of blood or blood products as deemed necessary by my physician may have serious consequences to my condition to include possible death. I hereby assume responsibility for my refusal and release the hospital, its personnel, and my physicians from any responsibility for the consequences of my refusal.    o  Refuse   5. I authorize the use of any specimen, organs, tissues, body parts or foreign objects that may be removed from my body during the operation/procedure for diagnosis, research or teaching purposes and their subsequent disposal by hospital authorities. I also authorize the release of specimen test results and/or written reports to my treating physician on the hospital medical staff or other referring or consulting physicians involved in my care, at the discretion of the Pathologist or my treating physician. 6.   I consent to the photographing or videotaping of the operations or procedures to be performed, including appropriate portions of my body for medical, scientific, or educational purposes, provided my identity is not revealed by the pictures or by descriptive texts accompanying them. If the procedure has been photographed/videotaped, the surgeon will obtain the original picture, image, videotape or CD. The hospital will not be responsible for storage, release or maintenance of the picture, image, tape or CD.    7.   I consent to the presence of a  or observers in the operating room as deemed necessary by my physician or their designees.     8.   I recognize that in the event my procedure results in extended X-Ray/fluoroscopy time, I may develop a skin reaction. 9. If I have a Do Not Attempt Resuscitation (DNAR) order in place, that status will be suspended while in the operating room, procedural suite, and during the recovery period unless otherwise explicitly stated by me (or a person authorized to consent on my behalf). The surgeon or my attending physician will determine when the applicable recovery period ends for purposes of reinstating the DNAR order. 10. Patients having a sterilization procedure: I understand that if the procedure is successful the results will be permanent and it will therefore be impossible for me to inseminate, conceive, or bear children. I also understand that the procedure is intended to result in sterility, although the result has not been guaranteed. 11. I acknowledge that my physician has explained sedation/analgesia administration to me including the risk and benefits I consent to the administration of sedation/analgesia as may be necessary or desirable in the judgment of my physician. I CERTIFY THAT I HAVE READ AND FULLY UNDERSTAND THE ABOVE CONSENT TO OPERATION and/or OTHER PROCEDURE.     _________________________________________ _________________________________     ___________________________________  Signature of Patient     Signature of Responsible Person                   Printed Name of Responsible Person                              _________________________________________ ______________________________        ___________________________________  Signature of Witness         Date  Time         Relationship to Patient    STATEMENT OF PHYSICIAN My signature below affirms that prior to the time of the procedure; I have explained to the patient and/or his/her legal representative, the risks and benefits involved in the proposed treatment and any reasonable alternative to the proposed treatment.  I have also explained the risks and benefits involved in refusal of the proposed treatment and alternatives to the proposed treatment and have answered the patient's questions. If I have a significant financial interest in a co-management agreement or a significant financial interest in any product or implant, or other significant relationship used in this procedure/surgery, I have disclosed this and had a discussion with my patient.     _______________________________________________________________ _____________________________  Lauri Louis of Physician)                                                                                         (Date)                                   (Time)  Patient Name: Na Quijano.     : 1945   Printed: 2023      Medical Record #: [id number]                                              Page 1 of 1

## (undated) NOTE — LETTER
[facility]  Administration of Anesthesia  I, Albert Marks Jr. agree to be cared for by a physician anesthesiologist alone and/or with a nurse anesthetist, who is specially trained to monitor me and give me medicine to put me to sleep or keep me comfortable during my procedure    I understand that my anesthesiologist and/or anesthetist is not an employee or agent of [facility] or [employer]. He or she works for [facility]    As the patient asking for anesthesia services, I agree to:  Allow the anesthesiologist (anesthesia doctor) to give me medicine and do additional procedures as necessary. Some examples are: Starting or using an “IV” to give me medicine, fluids or blood during my procedure, and having a breathing tube placed to help me breathe when I’m asleep (intubation). In the event that my heart stops working properly, I understand that my anesthesiologist will make every effort to sustain my life, unless otherwise directed by [facility] Do Not Resuscitate documents.  Tell my anesthesia doctor before my procedure:  If I am pregnant.  The last time that I ate or drank.  iii. All of the medicines I take (including prescriptions, herbal supplements, and pills I can buy without a prescription (including street drugs/illegal medications). Failure to inform my anesthesiologist about these medicines may increase my risk of anesthetic complications.  iv.If I am allergic to anything or have had a reaction to anesthesia before.  I understand how the anesthesia medicine will help me (benefits).  I understand that with any type of anesthesia medicine there are risks:  The most common risks are: nausea, vomiting, sore throat, muscle soreness, damage to my eyes, mouth, or teeth (from breathing tube placement).  Rare risks include: remembering what happened during my procedure, allergic reactions to medications, injury to my airway, heart, lungs, vision, nerves,  or muscles and in extremely rare instances death.  My doctor has explained to me other choices available to me for my care (alternatives).  Pregnant Patients (“epidural”):  I understand that the risks of having an epidural (medicine given into my back to help control pain during labor), include itching, low blood pressure, difficulty urinating, headache or slowing of the baby’s heart. Very rare risks include infection, bleeding, seizure, irregular heart rhythms and nerve injury.  Regional Anesthesia (“spinal”, “epidural”, & “nerve blocks”):  I understand that rare but potential complications include headache, bleeding, infection, seizure, irregular heart rhythms, and nerve injury.    _____________________________________________________________________________  Patient (or Representative) Signature/Relationship to Patient  Date   Time    _____________________________________________________________________________   Name (if used)    Language/Organization   Time    _____________________________________________________________________________  Nurse Anesthetist Signature     Date   Time  _____________________________________________________________________________  Anesthesiologist Signature     Date   Time  I have discussed the procedure and information above with the patient (or patient’s representative) and answered their questions. The patient or their representative has agreed to have anesthesia services.    _____________________________________________________________________________  Witness        Date   Time  I have verified that the signature is that of the patient or patient’s representative, and that it was signed before the procedure  Patient Name: Albert Marks      : 1945                 Printed: 3/20/2024 at 1:24 PM    Medical Record #: [id number]                                            Page 1 of 1  ----------ANESTHESIA CONSENT----------